# Patient Record
Sex: FEMALE | Race: WHITE | NOT HISPANIC OR LATINO | URBAN - METROPOLITAN AREA
[De-identification: names, ages, dates, MRNs, and addresses within clinical notes are randomized per-mention and may not be internally consistent; named-entity substitution may affect disease eponyms.]

---

## 2018-02-02 ENCOUNTER — HOSPITAL ENCOUNTER (OUTPATIENT)
Dept: MAMMOGRAPHY | Facility: CLINIC | Age: 48
Discharge: HOME | End: 2018-02-02
Attending: OBSTETRICS & GYNECOLOGY

## 2018-02-02 DIAGNOSIS — Z12.31 VISIT FOR SCREENING MAMMOGRAM: ICD-10-CM

## 2019-08-09 ENCOUNTER — RECORDS - HEALTHEAST (OUTPATIENT)
Dept: ADMINISTRATIVE | Facility: OTHER | Age: 49
End: 2019-08-09

## 2019-08-15 ENCOUNTER — HOSPITAL ENCOUNTER (OUTPATIENT)
Dept: MAMMOGRAPHY | Facility: CLINIC | Age: 49
Discharge: HOME | End: 2019-08-15
Attending: OBSTETRICS & GYNECOLOGY

## 2019-08-15 ENCOUNTER — HOSPITAL ENCOUNTER (OUTPATIENT)
Dept: ULTRASOUND IMAGING | Facility: CLINIC | Age: 49
Discharge: HOME | End: 2019-08-15
Attending: OBSTETRICS & GYNECOLOGY

## 2019-08-15 DIAGNOSIS — N64.89 FULLNESS OF BREAST: ICD-10-CM

## 2019-08-21 ENCOUNTER — HOSPITAL ENCOUNTER (OUTPATIENT)
Dept: MAMMOGRAPHY | Facility: CLINIC | Age: 49
Discharge: HOME | End: 2019-08-21
Attending: OBSTETRICS & GYNECOLOGY

## 2019-08-21 ENCOUNTER — HOSPITAL ENCOUNTER (OUTPATIENT)
Dept: ULTRASOUND IMAGING | Facility: CLINIC | Age: 49
Discharge: HOME | End: 2019-08-21
Attending: OBSTETRICS & GYNECOLOGY | Admitting: RADIOLOGY

## 2019-08-21 DIAGNOSIS — N63.20 BREAST MASS, LEFT: ICD-10-CM

## 2019-08-21 DIAGNOSIS — N64.89 FULLNESS OF BREAST: ICD-10-CM

## 2019-08-21 DIAGNOSIS — N63.10 BREAST MASS, RIGHT: ICD-10-CM

## 2019-08-23 ENCOUNTER — COMMUNICATION - HEALTHEAST (OUTPATIENT)
Dept: SURGERY | Facility: CLINIC | Age: 49
End: 2019-08-23

## 2019-08-26 ENCOUNTER — COMMUNICATION - HEALTHEAST (OUTPATIENT)
Dept: SURGERY | Facility: CLINIC | Age: 49
End: 2019-08-26

## 2019-08-27 ENCOUNTER — HOSPITAL ENCOUNTER (OUTPATIENT)
Dept: SURGERY | Facility: CLINIC | Age: 49
Discharge: HOME | End: 2019-08-27
Attending: SPECIALIST

## 2019-08-27 DIAGNOSIS — C50.411 MALIGNANT NEOPLASM OF UPPER-OUTER QUADRANT OF RIGHT BREAST IN FEMALE, ESTROGEN RECEPTOR POSITIVE (H): ICD-10-CM

## 2019-08-27 DIAGNOSIS — Z17.0 MALIGNANT NEOPLASM OF UPPER-OUTER QUADRANT OF RIGHT BREAST IN FEMALE, ESTROGEN RECEPTOR POSITIVE (H): ICD-10-CM

## 2019-08-27 RX ORDER — CHLORAL HYDRATE 500 MG
1 CAPSULE ORAL DAILY
Status: SHIPPED | COMMUNITY
Start: 2019-08-27

## 2019-08-27 RX ORDER — LACTOBACILLUS RHAMNOSUS GG 15B CELL
1 CAPSULE, SPRINKLE ORAL 2 TIMES DAILY WITH MEALS
Status: SHIPPED | COMMUNITY
Start: 2019-08-27

## 2019-08-27 ASSESSMENT — MIFFLIN-ST. JEOR: SCORE: 1526.63

## 2019-08-29 ENCOUNTER — COMMUNICATION - HEALTHEAST (OUTPATIENT)
Dept: SURGERY | Facility: CLINIC | Age: 49
End: 2019-08-29

## 2019-08-30 ENCOUNTER — AMBULATORY - HEALTHEAST (OUTPATIENT)
Dept: SURGERY | Facility: CLINIC | Age: 49
End: 2019-08-30

## 2019-08-30 DIAGNOSIS — C50.411 MALIGNANT NEOPLASM OF UPPER-OUTER QUADRANT OF RIGHT BREAST IN FEMALE, ESTROGEN RECEPTOR POSITIVE (H): ICD-10-CM

## 2019-08-30 DIAGNOSIS — Z17.0 MALIGNANT NEOPLASM OF UPPER-OUTER QUADRANT OF RIGHT BREAST IN FEMALE, ESTROGEN RECEPTOR POSITIVE (H): ICD-10-CM

## 2019-09-06 ENCOUNTER — AMBULATORY - HEALTHEAST (OUTPATIENT)
Dept: SURGERY | Facility: CLINIC | Age: 49
End: 2019-09-06

## 2019-09-06 DIAGNOSIS — C50.911 MALIGNANT NEOPLASM OF RIGHT BREAST (H): ICD-10-CM

## 2019-09-09 ENCOUNTER — RECORDS - HEALTHEAST (OUTPATIENT)
Dept: ADMINISTRATIVE | Facility: OTHER | Age: 49
End: 2019-09-09

## 2019-09-15 ENCOUNTER — ANESTHESIA - HEALTHEAST (OUTPATIENT)
Dept: SURGERY | Facility: HOSPITAL | Age: 49
End: 2019-09-15

## 2019-09-16 ENCOUNTER — HOSPITAL ENCOUNTER (OUTPATIENT)
Dept: NUCLEAR MEDICINE | Facility: HOSPITAL | Age: 49
Discharge: HOME | End: 2019-09-16
Attending: SPECIALIST

## 2019-09-16 ENCOUNTER — SURGERY - HEALTHEAST (OUTPATIENT)
Dept: SURGERY | Facility: HOSPITAL | Age: 49
End: 2019-09-16

## 2019-09-16 ENCOUNTER — HOSPITAL ENCOUNTER (OUTPATIENT)
Dept: NUCLEAR MEDICINE | Facility: HOSPITAL | Age: 49
Discharge: HOME | End: 2019-09-16
Attending: SPECIALIST | Admitting: SPECIALIST

## 2019-09-16 DIAGNOSIS — C50.911 MALIGNANT NEOPLASM OF RIGHT BREAST (H): ICD-10-CM

## 2019-09-20 ENCOUNTER — COMMUNICATION - HEALTHEAST (OUTPATIENT)
Dept: SURGERY | Facility: CLINIC | Age: 49
End: 2019-09-20

## 2019-09-20 ENCOUNTER — AMBULATORY - HEALTHEAST (OUTPATIENT)
Dept: SURGERY | Facility: CLINIC | Age: 49
End: 2019-09-20

## 2019-09-20 DIAGNOSIS — C50.411 MALIGNANT NEOPLASM OF UPPER-OUTER QUADRANT OF RIGHT BREAST IN FEMALE, ESTROGEN RECEPTOR POSITIVE (H): ICD-10-CM

## 2019-09-20 DIAGNOSIS — Z17.0 MALIGNANT NEOPLASM OF UPPER-OUTER QUADRANT OF RIGHT BREAST IN FEMALE, ESTROGEN RECEPTOR POSITIVE (H): ICD-10-CM

## 2019-09-23 ENCOUNTER — COMMUNICATION - HEALTHEAST (OUTPATIENT)
Dept: SURGERY | Facility: CLINIC | Age: 49
End: 2019-09-23

## 2019-09-24 ENCOUNTER — COMMUNICATION - HEALTHEAST (OUTPATIENT)
Dept: OTHER | Facility: CLINIC | Age: 49
End: 2019-09-24

## 2019-09-25 LAB
LAB AP CHARGES (HE HISTORICAL CONVERSION): ABNORMAL
PATH REPORT.ADDENDUM SPEC: ABNORMAL
PATH REPORT.COMMENTS IMP SPEC: ABNORMAL
PATH REPORT.COMMENTS IMP SPEC: ABNORMAL
PATH REPORT.FINAL DX SPEC: ABNORMAL
PATH REPORT.GROSS SPEC: ABNORMAL
PATH REPORT.MICROSCOPIC SPEC OTHER STN: ABNORMAL
PATH REPORT.MICROSCOPIC SPEC OTHER STN: ABNORMAL
PATH REPORT.RELEVANT HX SPEC: ABNORMAL
RESULT FLAG (HE HISTORICAL CONVERSION): ABNORMAL

## 2019-09-30 ENCOUNTER — RECORDS - HEALTHEAST (OUTPATIENT)
Dept: ADMINISTRATIVE | Facility: OTHER | Age: 49
End: 2019-09-30

## 2021-05-31 ENCOUNTER — RECORDS - HEALTHEAST (OUTPATIENT)
Dept: ADMINISTRATIVE | Facility: CLINIC | Age: 51
End: 2021-05-31

## 2021-05-31 NOTE — TELEPHONE ENCOUNTER
I notified Sharda of her ER/IL status and that the Her 2 has been sent for FISH.  Reviewed plan to come for her surgical consult with Dr. Hdez tomorrow.

## 2021-05-31 NOTE — PROGRESS NOTES
This is a 49 y.o.  female who I'm asked to see by Carolyn Trimble MD for evaluation of a right breast cancer.  This was picked up by the patient.  She states that she has felt a fullness there for about a year.  She had a mammogram done a little over a year ago and she states that she thought she felt that then but it is not clear that she mentioned it to anybody so it was not a diagnostic mammogram then.  This time she did pointed out and there is also clearly a change on her mammogram.   A needle biopsy was done which shows an invasive ductal carcinoma.  It is estrogen receptor positive, progesterone receptor positive and HER-2 is equivocal.    She has no family history of breast cancer.      PAST MEDICAL HISTORY:  No past medical history on file.  Past Surgical History:   Procedure Laterality Date     US BREAST CORE BIOPSY BILATERAL Bilateral 8/21/2019       Medications:    Current Outpatient Medications:      cholecalciferol, vitamin D3, 1,000 unit tablet, Take 1,000 Units by mouth daily., Disp: , Rfl:      Lactobacillus rhamnosus GG (CULTURELLE) 15 billion cell CpSP, Take 1 capsule by mouth 2 (two) times a day with meals., Disp: , Rfl:      MILK THISTLE ORAL, Take by mouth., Disp: , Rfl:      omega-3/dha/epa/fish oil (FISH OIL-OMEGA-3 FATTY ACIDS) 300-1,000 mg capsule, Take 1 g by mouth daily., Disp: , Rfl:      UNABLE TO FIND, Med Name: Tumeric, Disp: , Rfl:      UNABLE TO FIND, Med Name: DIM Supplement, Disp: , Rfl:      multivitamin with iron (ONE DAILY WITH IRON) Tab tablet, Takes with probiotic, fish oil, turmeric, and vitamin D, Disp: , Rfl:     Allergies:  Allergies   Allergen Reactions     Shellfish Containing Products Hives     Also throat starts to swell.       Social History:  She does not smoke.  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    ROS:  A 12 point comprehensive review of systems was negative except as noted.    Physical Exam  LMP 08/03/2019 (Exact Date)   General:alert, appears stated age and  cooperative  Lungs:clear to auscultation bilaterally  CV:regular rate and rhythm, S1, S2 normal, no murmur, click, rub or gallop  Breasts: Fairly large clearly palpable mass just underneath the skin in the upper outer quadrant of the right breast.  There is some slight erythema to the skin directly over it.  However this is isolated to just this area right at the tumor.  No palpable masses on the left.  Lymph Nodes:no axillary nodes palpated  Neuro:Grossly normal  Musculoskeletal: Normal range of motion of her upper extremities.  Skin: Normal skin turgor.  Psych: She is a very calm demeanor.  Very methodical in her thinking.    Reviewed her mammograms and ultrasound and pathology.     Impression: Right Breast Cancer. Clinically T2-3, N0.  Discussed the surgical options for treatment of breast cancer which generally are a lumpectomy (partial mastectomy) combined with radiation versus a mastectomy.  Explained that the survival benefit is the same for both.  The difference is in local recurrence risk.  The patient is a Poor candidate for a lumpectomy.  At least at this time.  I explained to her that the tumor is just simply too large and close to the skin and nipple to make a lumpectomy possible at this time.  However, her HER-2 status is still questionable.  Explained that if this comes back positive, then she clearly would be a did it for neoadjuvant chemotherapy.  This could possibly shrink the tumor down and make her a better candidate for lumpectomy.  I also reviewed with her the Ibrance trial which takes women who are estrogen and progestin receptor positive and with a low Oncotype score and offers neoadjuvant hormone therapy.  The patient states that she would not be interested in the second trial.  In fact she would prefer to just get started with surgery and is quite adamant that she wants just bilateral mastectomies.  I did explain to her that I will need even with a mastectomy to remove a fair amount of the  skin around this mass and nipple and so that she would need to be okay with going smaller with any type of reconstruction.  She is very much okay with that.  I also did warn her that she is not out of the woods from needing chemotherapy and radiation even if she does have a mastectomy.  It still going to depend on the final results.  He is a very active person with horse riding so if she does have reconstruction I think pre-pec implants would be her best option.  Discussed SLN biopsy.  The procedure and rationale were explained.  I have given her the list of the plastic surgeons I work with and encouraged her to call them right away to start the process of getting surgery set up assuming her HER-2 comes back negative.  If the HER-2 comes back positive, I told her I would still recommend that she undergo neoadjuvant chemotherapy.  Even if she still chooses a mastectomy after that, there is some data dissected just there may be a survival benefit with doing it this way.      Plan: I will call her once we have the HER-2 status back and then we will go over all of this again and make final plans.  Again, in the meantime she is going to visit with the plastic surgeon.  The risks and benefits of surgery were explained.  Also talked about expected recovery time for all of the different options.

## 2021-05-31 NOTE — TELEPHONE ENCOUNTER
Poornima called, said she is scheduled to see Dr. Fisher for a plastic surgeon consult Friday, 8-30-19.  She said she is hoping for surgery the week of 9-9-19.  I told her that may or may not be possilbe as it is more difficult to get two busy surgeons schedules to work together for a time but I will give Dr. Hdez's surgery scheduler a head's up.  Msg sent to Sharda Kumar.  Support provided, invited calls.

## 2021-05-31 NOTE — TELEPHONE ENCOUNTER
I notified Dr. Carolyn Trimble of Caterina's positive breast biopsy results.  She asked I call patient and help her schedule an appointment to see Dr. Hdez for surgical consult.  I faxed patient's path to her at [phone].    I called Caterina to inform her of her breast core biopsy results.  I let her know that her left biopsy came back benign and the radiologist has recommended she return to one year screening mammogram on that side.  I told her that the right breast did show cancer cells.  I reviewed with her the pathology results.  I told her the receptors and Her 2 are still pending and if there is a delay, it may impact the timing of her surgical consult.  I reviewed with her next steps, timing of appts and the sequence of events with a new diagnosis of breast cancer.  I have scheduled her to see Dr. Hdez on Tuesday, 8/27/19 at 1500/1440 check in at the [facility].  I told her I will have a packet of breast cancer resources to give her at that appointment.  She accepted this appointment, but said she may go to [place].  Support and encouragement and reassurance provided.  I have given her my contact information and invited calls.

## 2021-05-31 NOTE — PROGRESS NOTES
"[facility] consult with Dr. Hdez regarding her newly diagnosed breast cancer.  RN assessment and EMR update. /86 (Patient Site: Left Arm, Patient Position: Sitting)   Pulse 83   Resp 16   Ht 5' 6\" (1.676 m)   Wt 195 lb 1.3 oz (88.5 kg)   LMP 08/03/2019 (Exact Date)   SpO2 98%   BMI 31.49 kg/m  .  Patient given RN and MD cards and information on scheduling her surgery.  She said she has 2 of her own children and also step children that are adults and 5 grandchildren.  Her own 2 children are ages 18 and 22.  Her son is leaving for [place] this weekend.  Talked with her about the resource, [employer], if her son is needing any support.  Patient met with Dr. Hdez, see report for details.  She will await the results of the Her 2 by Max.  We will notify her when available.  She is thinking she would like to have a bilateral mastectomy.  She was given the list of plastic surgeons to call and make an appointment.  Support and reassurance provided.  Offered breast cancer resource booklets, she declined. Invited calls.  RN time 22 mins  "

## 2021-06-01 NOTE — TELEPHONE ENCOUNTER
I called Poornima to let her know that I have faxed the referral from Dr. Hdez and her needed records to the [facility] [place].  I have asked them to call Poornima to get her scheduled. Poornima is accepting of plan.

## 2021-06-01 NOTE — ANESTHESIA PREPROCEDURE EVALUATION
Anesthesia Evaluation      Patient summary reviewed   History of anesthetic complications (PONV, motion sickness)     Airway   Mallampati: IV  Neck ROM: full   Pulmonary - negative ROS    breath sounds clear to auscultation                         Cardiovascular - negative ROS  Exercise tolerance: > or = 4 METS  Rhythm: regular        Neuro/Psych    (+) anxiety/panic attacks,     Endo/Other    (-) not pregnant     Comments: Breast CA    GI/Hepatic/Renal - negative ROS      Other findings:     NPO > 8 hrs      Dental - normal exam                        Anesthesia Plan  Planned anesthetic: general endotracheal and peripheral nerve block      Propofol gtt with minimal gas  Paravertebral blocks for post-op pain as requested by surgeon  Possible glidescope      ASA 2   Induction: intravenous   Anesthetic plan and risks discussed with: patient and spouse  Anesthesia plan special considerations: video-assisted, antiemetics,   Post-op plan: routine recovery

## 2021-06-01 NOTE — ANESTHESIA PROCEDURE NOTES
Peripheral Block    Patient location during procedure: pre-op  Start time: 9/16/2019 8:50 AM  End time: 9/16/2019 8:55 AM  post-op analgesia per surgeon order as noted in medical record  Staffing:  Performing  Anesthesiologist: Joanne Brand MD  Preanesthetic Checklist  Completed: patient identified, site marked, risks, benefits, and alternatives discussed, timeout performed, consent obtained, airway assessed, oxygen available, suction available, emergency drugs available and hand hygiene performed  Peripheral Block  Block type: other, paravertebral - thoracic  Prep: ChloraPrep  Patient position: supine  Patient monitoring: cardiac monitor, continuous pulse oximetry, heart rate and blood pressure  Anesthesia laterality: B/L.  Injection technique: ultrasound guided    Ultrasound used to visualize needle placement in proximity to nerve being blocked: yes   Permanent ultrasound image captured for medical record  Sterile gel and probe cover used for ultrasound.    Needle  Needle type: echogenic   Needle gauge: 20G  Needle length: 4 in  no peripheral nerve catheter placed  Assessment  Injection assessment: no difficulty with injection, negative aspiration for heme, no paresthesia on injection and incremental injection  Additional Notes      T3-4

## 2021-06-01 NOTE — ANESTHESIA POSTPROCEDURE EVALUATION
Patient: Caterina Rico  Right Modified Radical Mastectomy, Left Simple Masectomy. - Rconsruction with Dr. De Souza, with Right Sage Lymph Node Biopsy, BILATERAL BREAST RECONSTRUCTION D, BILATERAL TISSUE EXPANDERS  Anesthesia type: general    Patient location: PACU  Last vitals:   Vitals Value Taken Time   /67 9/16/2019  1:00 PM   Temp 36.6  C (97.8  F) 9/16/2019 12:34 PM   Pulse 86 9/16/2019  1:06 PM   Resp 10 9/16/2019  1:06 PM   SpO2 99 % 9/16/2019  1:06 PM   Vitals shown include unvalidated device data.  Post vital signs: stable  Level of consciousness: awake and responds to simple questions  Post-anesthesia pain: pain controlled  Post-anesthesia nausea and vomiting: no  Pulmonary: unassisted, return to baseline  Cardiovascular: stable and blood pressure at baseline  Hydration: adequate  Anesthetic events: no    QCDR Measures:  ASA# 11 - Nicole-op Cardiac Arrest: ASA11B - Patient did NOT experience unanticipated cardiac arrest  ASA# 12 - Nicole-op Mortality Rate: ASA12B - Patient did NOT die  ASA# 13 - PACU Re-Intubation Rate: ASA13B - Patient did NOT require a new airway mgmt  ASA# 10 - Composite Anes Safety: ASA10A - No serious adverse event    Additional Notes:

## 2021-06-01 NOTE — ANESTHESIA CARE TRANSFER NOTE
Last vitals:   Vitals:    09/16/19 1234   BP: 135/65   Pulse: (!) 101   Resp: 14   Temp: 36.6  C (97.8  F)   SpO2: 100%     Patient's level of consciousness is drowsy  Spontaneous respirations: yes  Maintains airway independently: yes  Dentition unchanged: yes  Oropharynx: oropharynx clear of all foreign objects    QCDR Measures:  ASA# 20 - Surgical Safety Checklist: WHO surgical safety checklist completed prior to induction    PQRS# 430 - Adult PONV Prevention: 4558F - Pt received => 2 anti-emetic agents (different classes) preop & intraop  ASA# 8 - Peds PONV Prevention: NA - Not pediatric patient, not GA or 2 or more risk factors NOT present  PQRS# 424 - Nicole-op Temp Management: 4559F - At least one body temp DOCUMENTED => 35.5C or 95.9F within required timeframe  PQRS# 426 - PACU Transfer Protocol: - Transfer of care checklist used  ASA# 14 - Acute Post-op Pain: ASA14B - Patient did NOT experience pain >= 7 out of 10

## 2021-06-01 NOTE — TELEPHONE ENCOUNTER
Per Dr. Hdez's request, I called and [facility] for [facility] in [place] ([facility]) to return my call about setting up an appointment for Poornima.  I called Poornima to let her know I will call her after I speak to someone there about setting up an appointment.I also let her know that Dr. Hdez placed an order for a compression sleeve for her to wear during her radiation.  Poornima said she has been working with a lymphedema therapist closer to home and that person was going to work on getting her a compression garment.      Support provided, invited calls.

## 2021-06-01 NOTE — ANESTHESIA PROCEDURE NOTES
Peripheral Block    Patient location during procedure: pre-op  Start time: 9/16/2019 8:58 AM  End time: 9/16/2019 9:02 AM  post-op analgesia per surgeon order as noted in medical record  Staffing:  Performing  Anesthesiologist: Joanne Brand MD  Preanesthetic Checklist  Completed: patient identified, site marked, risks, benefits, and alternatives discussed, timeout performed, consent obtained, airway assessed, oxygen available, suction available, emergency drugs available and hand hygiene performed  Peripheral Block  Block type: other, paravertebral - thoracic  Prep: ChloraPrep  Patient position: prone  Patient monitoring: cardiac monitor, continuous pulse oximetry, heart rate and blood pressure  Anesthesia laterality: B/L.  Injection technique: ultrasound guided    Ultrasound used to visualize needle placement in proximity to nerve being blocked: yes   Permanent ultrasound image captured for medical record  Sterile gel and probe cover used for ultrasound.    Needle  Needle type: echogenic   Needle gauge: 20G  Needle length: 4 in  no peripheral nerve catheter placed  Assessment  Injection assessment: no difficulty with injection, negative aspiration for heme, no paresthesia on injection and incremental injection  Additional Notes      T5-6

## 2021-06-01 NOTE — ANESTHESIA PROCEDURE NOTES
Peripheral Block    Patient location during procedure: pre-op  Start time: 9/16/2019 8:55 AM  End time: 9/16/2019 8:58 AM  post-op analgesia per surgeon order as noted in medical record  Staffing:  Performing  Anesthesiologist: Joanne Brand MD  Preanesthetic Checklist  Completed: patient identified, site marked, risks, benefits, and alternatives discussed, timeout performed, consent obtained, airway assessed, oxygen available, suction available, emergency drugs available and hand hygiene performed  Peripheral Block  Block type: other, paravertebral - thoracic  Prep: ChloraPrep  Patient position: supine  Patient monitoring: cardiac monitor, continuous pulse oximetry, heart rate and blood pressure  Anesthesia laterality: B/L.  Injection technique: ultrasound guided    Ultrasound used to visualize needle placement in proximity to nerve being blocked: yes   Permanent ultrasound image captured for medical record  Sterile gel and probe cover used for ultrasound.    Needle  Needle type: echogenic   Needle gauge: 20G  Needle length: 4 in  no peripheral nerve catheter placed  Assessment  Injection assessment: no difficulty with injection, negative aspiration for heme, no paresthesia on injection and incremental injection  Additional Notes      T4-5

## 2021-06-03 VITALS — BODY MASS INDEX: 31.35 KG/M2 | WEIGHT: 195.08 LBS | HEIGHT: 66 IN

## 2021-06-03 VITALS — BODY MASS INDEX: 31.47 KG/M2 | WEIGHT: 195 LBS

## 2021-06-16 PROBLEM — Z85.3 PERSONAL HISTORY OF BREAST CANCER: Status: ACTIVE | Noted: 2019-09-16

## 2021-06-16 PROBLEM — C50.911 BREAST CANCER, RIGHT (H): Status: ACTIVE | Noted: 2019-09-03

## 2021-07-21 ENCOUNTER — RECORDS - HEALTHEAST (OUTPATIENT)
Dept: ADMINISTRATIVE | Facility: CLINIC | Age: 51
End: 2021-07-21

## 2025-08-15 ENCOUNTER — APPOINTMENT (OUTPATIENT)
Dept: GENERAL RADIOLOGY | Age: 55
End: 2025-08-15
Attending: EMERGENCY MEDICINE

## 2025-08-15 ENCOUNTER — HOSPITAL ENCOUNTER (EMERGENCY)
Age: 55
Discharge: HOME | End: 2025-08-15
Attending: EMERGENCY MEDICINE

## 2025-08-15 VITALS
DIASTOLIC BLOOD PRESSURE: 72 MMHG | BODY MASS INDEX: 28.12 KG/M2 | OXYGEN SATURATION: 98 % | RESPIRATION RATE: 19 BRPM | TEMPERATURE: 98.2 F | WEIGHT: 175 LBS | HEIGHT: 66 IN | SYSTOLIC BLOOD PRESSURE: 172 MMHG | HEART RATE: 62 BPM

## 2025-08-15 DIAGNOSIS — S42.255A CLOSED NONDISPLACED FRACTURE OF GREATER TUBEROSITY OF LEFT HUMERUS, INITIAL ENCOUNTER: ICD-10-CM

## 2025-08-15 DIAGNOSIS — S22.32XA CLOSED FRACTURE OF ONE RIB OF LEFT SIDE, INITIAL ENCOUNTER: Primary | ICD-10-CM

## 2025-08-15 PROCEDURE — 71101 X-RAY EXAM UNILAT RIBS/CHEST: CPT

## 2025-08-15 PROCEDURE — 73030 X-RAY EXAM OF SHOULDER: CPT

## 2025-08-15 PROCEDURE — 10002016 HB COUNTER INCENTIVE SPIROMETRY

## 2025-08-15 PROCEDURE — 71101 X-RAY EXAM UNILAT RIBS/CHEST: CPT | Performed by: STUDENT IN AN ORGANIZED HEALTH CARE EDUCATION/TRAINING PROGRAM

## 2025-08-15 PROCEDURE — A4565 SLINGS: HCPCS | Performed by: EMERGENCY MEDICINE

## 2025-08-15 PROCEDURE — 73030 X-RAY EXAM OF SHOULDER: CPT | Performed by: STUDENT IN AN ORGANIZED HEALTH CARE EDUCATION/TRAINING PROGRAM

## 2025-08-15 PROCEDURE — 99284 EMERGENCY DEPT VISIT MOD MDM: CPT

## 2025-08-15 PROCEDURE — 99284 EMERGENCY DEPT VISIT MOD MDM: CPT | Performed by: EMERGENCY MEDICINE

## 2025-08-15 RX ORDER — HYDROCODONE BITARTRATE AND ACETAMINOPHEN 5; 325 MG/1; MG/1
1 TABLET ORAL EVERY 6 HOURS PRN
Qty: 12 TABLET | Refills: 0 | Status: SHIPPED | OUTPATIENT
Start: 2025-08-15

## 2025-08-15 ASSESSMENT — PAIN SCALES - GENERAL
PAINLEVEL_OUTOF10: 5
PAINLEVEL_OUTOF10: 5